# Patient Record
(demographics unavailable — no encounter records)

---

## 2024-11-20 NOTE — HISTORY OF PRESENT ILLNESS
[Home] : at home, [unfilled] , at the time of the visit. [Medical Office: ([facility])___] : at the medical office located in  [Verbal consent obtained from patient] : the patient, [unfilled] [FreeTextEntry1] : Initial hx 4/2019 5/2000 - sensory myelitis 6wks post-partum after daughter's birth. Had MRIs and LP. Thought about MS but then had R ON 6/2000. Had trained at [facility] so discussed with Dr. Dupree who dx'd NMO with +aqp4 ab, recommended imuran and she did well. vision and sensation resolved. Stopped in 2002 or so. 3/2005 - had L ON in the setting of coming off of imuran. then resumed in 2005. No new symptoms since 2005. Currently stable on imuran 175mg daily and was recommended by Dr. Kulkarni to stay on imuran as she has been relapse free and s/e free. Later, was found to have macrocytic anemia thought to be 2/2 imuran.   Subj interval:  Reports doing well, no new sx. Has been having trouble with insurance covering imuran.  Chronic R torso pain, tolerable, fluctuates, since 2000.   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 hx SCC rt hand, s/p Mohs June 2019,   MEDS: imuran 175mg daily omeprazole lisinopril   O:  AO3. Normally conversant. Follows commands, names, and repeats. Good attention. face symm   Brain MRI with and without contrast images 7/9/2018 - read as stable   AP: 59yo on imuran for NMO (aqp4+ in the past through [facility] [facility]). doing well for many years, but with mild worsening of macrocellular anemia.   All questions answered. discussed covid related concerns. education provided.  - decrease imuran to 150mg daily (from 175) given macrocytic anemia. - blood work today, then q2wks to monitor - discussed uplizna and ultomiris, full risk benefit of each. would consider switch to uplizna if anemia persists. - cont imuran pending new blood work. - RTC 1-2m to discuss further, sooner prn

## 2024-12-18 NOTE — ASSESSMENT
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   # Macrocytic anemia: She was noted to have macrocytic anemia (baseline Hgb 10-11), thought to be related to azathioprine. Her azathioprine dose was recently decreased from 175 to 150 mg daily due to the anemia. She also has borderline low B12 (369), present since at least 2021. We will repeat nutritional studies, including testing for pernicious anemia given the borderline low B12, thyroid function, and hemolysis markers to look for other possible causes leading to her macrocytic anemia. - Labs: CBC, CMP, iron studies, B12, MMA, pernicious anemia serologies, TSH, LDH, reticulocyte count, haptoglobin, Marine - No hematologic contraindication at this time to discontinuation of azathioprine - Pending lab results, will arrange follow up as clinically indicated

## 2024-12-18 NOTE — HISTORY OF PRESENT ILLNESS
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   She was diagnosed with neuromyelitis optica in 2000 at about 6 weeks postpartum. She has remained on azathioprine consistently since 2005. Her disease is now in remission. She was noted to have macrocytic anemia (baseline Hgb 10-11), thought to be related to azathioprine. Her azathioprine dose was recently decreased from 175 to 150 mg daily due to the anemia. She remains asymptomatic from her anemia. Her most recent CBC from 11/7/24 showed Hgb 10.7 (.7) with preserved WBC and platelet count. She has borderline low B12 (369), present since at least 2021. No iron or folate deficiency. Her reticulocyte index is hypoproliferative. TSH is normal.  Of note, she also reports that she was diagnosed with ITP at age 18. She completed a prednisone taper over several months and has remained in remission.    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

## 2025-01-06 NOTE — ASSESSMENT
[FreeTextEntry1] : 61 y/o F with h/o transverse myelitis (2000), macrocytic anemia, neuromyelitis optica, HTN here for CPE.  Transverse myelitis/NMO - Continue f/u with Neuro and neuro-ophthalmology - Continue azathioprine 150mg (lowered due to anemia)  HTN - BP at goal - Continue lisinopril 20  Osteopenia, Joint pains, Hot flashes - pt interested in HRT to prevent osteoporosis and for menopausal sx - Continue Climara - Increase Ca/Vit D in diet - Continue Vit D 400 IU daily; Vit D normal 11/2024  Macrocytic anemia - likely multifactorial including low B12 and azathioprine - F/U with Hematology - Continue B12 1000mcg daily  HCM: - Reviewed labs from 11/2024 - DEXA 10/2023 - osteopenia in femoral neck; normal in spine and hip; repeat in Oct 2025 - Mammo 11/24 - normal; repeat in 1 year - Colonoscopy 2019 - non-bleeding external hemorrhoids; pt interested in repeat colonoscopy given anemia; GI referral - Thinks Td <10 years ago - Shingrix 2020 - Flu vaccine - fall 2024 at [facility] - Will get Prevnar 20 and RSV at [facility]  RTC in 6 months or sooner prn - Received flu vaccine  RTC in 3-6 months or sooner prn.

## 2025-01-06 NOTE — HISTORY OF PRESENT ILLNESS
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 Azathioprine dose recently decreased from 175 to 150mg. Pernicious anemia serologies negative. Anemia improved with decreased azathioprine dose and B12 borderline low, likely contributing. Was started on B12.   NMO - dx'ed in 2000 about 6 weeks postpartum and has been on azathioprine since 2005. Following with Dr. Small; considering Uplizna  On Climara Pro  weekly. Needs refil.  Wanted to get a baseline hearing exam. Sometimes has to ask to repeat  Sometimes gets papules on arm that sometimes scab over. Has been in arm or  Appetite good - chicken sandwich, varied diet. Tries to minimize meat.  Has half and half with coffee.   BMs normal.  Urinating well.  Sleep good.  No chest pain. No SOB>   Just joined [employer] a few weeks.  No numbness/tingling.   HTN - on lisinopril. BPs normal at home 120s/70s. No headaches.  Mood good.  EtOH 2-3 glasses/week No drugs.   400 IU daily  magnesium chelate.

## 2025-01-06 NOTE — HEALTH RISK ASSESSMENT
[Patient reported mammogram was normal] : Patient reported mammogram was normal [Patient reported bone density results were abnormal] : Patient reported bone density results were abnormal [Patient reported colonoscopy was normal] : Patient reported colonoscopy was normal [Yes] : Yes [2 - 4 times a month (2 pts)] : 2-4 times a month (2 points) [1 or 2 (0 pts)] : 1 or 2 (0 points) [Never (0 pts)] : Never (0 points) [0] : 2) Feeling down, depressed, or hopeless: Not at all (0) [PHQ-2 Negative - No further assessment needed] : PHQ-2 Negative - No further assessment needed [Never] : Never [[id number]] : 0 [MammogramDate] : 11/24 [PapSmearDate] : 09/23 [BoneDensityDate] : 10/23 [BoneDensityComments] : osteopenia [ColonoscopyDate] : 03/19 [ColonoscopyComments] : Non-bleeding hemorrhoids

## 2025-07-18 NOTE — HISTORY OF PRESENT ILLNESS
[Home] : at home, [unfilled] , at the time of the visit. [Medical Office: ([facility])___] : at the medical office located in  [Verbal consent obtained from patient] : the patient, [unfilled] [FreeTextEntry1] : Initial hx 4/2019 5/2000 - sensory myelitis 6wks post-partum after daughter's birth. Had MRIs and LP. Thought about MS but then had R ON 6/2000. Had trained at [place] so discussed with Dr. Dupree who dx'd NMO with +aqp4 ab, recommended imuran and she did well. vision and sensation resolved. Stopped in 2002 or so. 3/2005 - had L ON in the setting of coming off of imuran. then resumed in 2005. No new symptoms since 2005. Currently stable on imuran 175mg daily and was recommended by Dr. Kulkarni to stay on imuran as she has been relapse free and s/e free. Later, was found to have macrocytic anemia thought to be 2/2 imuran. 12/2024 - planned to start uplizna but held off due to improvement in macrocytic anemia.   Subj interval:  Reports doing well, no new sx.  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   Chronic R torso pain, tolerable, fluctuates, since 2000.   PMHX: HTN NMO macrocytic anemia vitD deficiency osteopenia volecular cyst ITP when 17yo. hx SCC rt hand, s/p Mohs June 2019, B12 supplementation  MEDS: imuran 175mg daily omeprazole lisinopril   O:  AO3. Normally conversant. Follows commands, names, and repeats. Good attention. face symm   Brain MRI with and without contrast images 7/9/2018 - read as stable   AP: 60yo on imuran for NMO (aqp4+ in the past through [place]). doing well for many years, but with mild worsening of macrocellular anemia in late 2024, then improved by mid 2025. Had considered DMT switch but opted to continue imuran.   All questions answered. discussed covid related concerns. education provided.  - cont imuran to 150mg daily (from 175) given macrocytic anemia. - blood work today, then q6m - cont imuran pending new blood work. - RTC 6m